# Patient Record
Sex: FEMALE | Race: BLACK OR AFRICAN AMERICAN | NOT HISPANIC OR LATINO | URBAN - METROPOLITAN AREA
[De-identification: names, ages, dates, MRNs, and addresses within clinical notes are randomized per-mention and may not be internally consistent; named-entity substitution may affect disease eponyms.]

---

## 2017-06-14 ENCOUNTER — DOCUMENTATION ONLY (OUTPATIENT)
Dept: TRANSPLANT | Facility: CLINIC | Age: 27
End: 2017-06-14

## 2017-06-14 NOTE — NURSING
Arturo spoke with CHARISSA Ramirez, [facility] at patient's dialysis unit and informed that the referral was received. We are not able to process the referral due to lack of nephrologist notes. Bebe verbalized understanding of the above information and states she will fax over the requested information.

## 2017-06-15 ENCOUNTER — TELEPHONE (OUTPATIENT)
Dept: TRANSPLANT | Facility: CLINIC | Age: 27
End: 2017-06-15

## 2019-12-12 DIAGNOSIS — T85.611A PD CATHETER DYSFUNCTION: Primary | ICD-10-CM

## 2020-03-04 DIAGNOSIS — Z49.01 ENCOUNTER FOR FITTING AND ADJUSTMENT OF DIALYSIS CATHETER: Primary | ICD-10-CM

## 2020-06-24 DIAGNOSIS — N18.6 END STAGE RENAL DISEASE: Primary | ICD-10-CM

## 2020-07-01 ENCOUNTER — OFFICE VISIT (OUTPATIENT)
Dept: SURGERY | Facility: CLINIC | Age: 30
End: 2020-07-01
Payer: MEDICARE

## 2020-07-01 DIAGNOSIS — N18.6 END STAGE RENAL DISEASE: Primary | ICD-10-CM

## 2020-07-01 PROCEDURE — 99202 PR OFFICE/OUTPT VISIT, NEW, LEVL II, 15-29 MIN: ICD-10-PCS | Mod: S$GLB,,, | Performed by: SURGERY

## 2020-07-01 PROCEDURE — 99202 OFFICE O/P NEW SF 15 MIN: CPT | Mod: S$GLB,,, | Performed by: SURGERY

## 2020-07-01 RX ORDER — MINOXIDIL 2.5 MG/1
2.5 TABLET ORAL DAILY
COMMUNITY

## 2020-07-01 RX ORDER — PROMETHAZINE HYDROCHLORIDE 25 MG/1
TABLET ORAL
COMMUNITY

## 2020-07-01 RX ORDER — CLONIDINE 0.3 MG/24H
PATCH, EXTENDED RELEASE TRANSDERMAL
COMMUNITY
Start: 2020-06-27

## 2020-07-01 RX ORDER — LABETALOL 100 MG/1
TABLET, FILM COATED ORAL
COMMUNITY

## 2020-07-01 RX ORDER — TRAZODONE HYDROCHLORIDE 50 MG/1
TABLET ORAL
COMMUNITY
Start: 2020-06-16

## 2020-07-01 RX ORDER — CINACALCET 30 MG/1
TABLET, FILM COATED ORAL
COMMUNITY
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
## 2020-07-01 NOTE — LETTER
July 1, 2020      Brenton Dumont MD  [facility] [address]  [place] [address]           [facility] General [facility]  [address]  [place] [address]  Phone: [phone]  Fax: [phone]          Patient: Lizett Luque   MR Number: [id number]   YOB: 1990   Date of Visit: 7/1/2020       Dear Dr. Brenton Dumont:    Thank you for referring Lizett Luque to me for evaluation. Attached you will find relevant portions of my assessment and plan of care.    If you have questions, please do not hesitate to call me. I look forward to following Lizett Luque along with you.    Sincerely,    Hernandez Eldridge MD    Enclosure  CC:  No Recipients    If you would like to receive this communication electronically, please contact [email or web address] or [phone] to request more information on [employer] access.    For providers and/or their staff who would like to refer a patient to [facility], please contact us through our one-stop-shop provider referral line, [facility] , at [phone].    If you feel you have received this communication in error or would no longer like to receive these types of communications, please e-mail [email or web address]

## 2020-07-07 LAB
ANION GAP SERPL CALC-SCNC: 10 MMOL/L (ref 3–11)
BASOPHILS NFR SNV MANUAL: 0.4 % (ref 0–3)
BUN SERPL-MCNC: 19 MG/DL (ref 7–18)
BUN/CREAT SERPL: 2.15 RATIO (ref 7–18)
CALCIUM BLD-MCNC: NEGATIVE MG/DL
CALCIUM SERPL-MCNC: 9.6 MG/DL (ref 8.8–10.5)
CHLORIDE SERPL-SCNC: 101 MMOL/L (ref 100–108)
CO2 SERPL-SCNC: 31 MMOL/L (ref 21–32)
COVID-19 AB, IGM: NEGATIVE
CREAT SERPL-MCNC: 8.82 MG/DL (ref 0.55–1.02)
EOSINOPHIL NFR SNV MANUAL: 3.1 % (ref 1–3)
ERYTHROCYTE [DISTWIDTH] IN BLOOD BY AUTOMATED COUNT: 15.9 % (ref 12.5–18)
GFR ESTIMATION: 6
GLUCOSE SERPL-MCNC: 91 MG/DL (ref 70–110)
HCG QUALITATIVE: NEGATIVE
HCT VFR BLD AUTO: 37.8 % (ref 37–47)
HGB BLD-MCNC: 12 G/DL (ref 12–16)
LYMPHOCYTES NFR SNV MANUAL: 11.3 % (ref 25–40)
MANUAL NRBC PER 100 CELLS: 0 %
MCH RBC QN AUTO: 31.4 PG (ref 27–31.2)
MCHC RBC AUTO-ENTMCNC: 31.7 G/DL (ref 31.8–35.4)
MCV RBC AUTO: 99 FL (ref 80–97)
MONOCYTES/100 LEUKOCYTES: 9.8 % (ref 1–15)
NEUTROPHILS NFR BLD: 3.38 10*3/UL (ref 1.8–7.7)
NEUTROPHILS NFR SNV MANUAL: 75.2 % (ref 37–80)
PLATELETS: 193 10*3/UL (ref 142–424)
POTASSIUM SERPL-SCNC: 4.1 MMOL/L (ref 3.6–5.2)
RBC # BLD AUTO: 3.82 10*6/UL (ref 4.2–5.4)
SODIUM BLD-SCNC: 142 MMOL/L (ref 135–145)
WBC # BLD: 4.5 10*3/UL (ref 4.6–10.2)

## 2020-07-14 ENCOUNTER — OUTSIDE PLACE OF SERVICE (OUTPATIENT)
Dept: ADMINISTRATIVE | Facility: OTHER | Age: 30
End: 2020-07-14
Payer: MEDICARE

## 2020-07-14 LAB — POTASSIUM SERPL-SCNC: 4.6 MMOL/L (ref 3.6–5.2)

## 2020-07-14 PROCEDURE — 49422 PR REMOVAL TUNNELED INTRAPERITONEAL CATHETER: ICD-10-PCS | Mod: ,,, | Performed by: SURGERY

## 2020-07-14 PROCEDURE — 49422 REMOVE TUNNELED IP CATH: CPT | Mod: ,,, | Performed by: SURGERY

## 2021-03-18 ENCOUNTER — OFFICE VISIT (OUTPATIENT)
Dept: OBSTETRICS AND GYNECOLOGY | Facility: CLINIC | Age: 31
End: 2021-03-18
Payer: MEDICARE

## 2021-03-18 VITALS
WEIGHT: 151 LBS | SYSTOLIC BLOOD PRESSURE: 130 MMHG | HEART RATE: 86 BPM | DIASTOLIC BLOOD PRESSURE: 84 MMHG | BODY MASS INDEX: 22.36 KG/M2 | HEIGHT: 69 IN

## 2021-03-18 DIAGNOSIS — Z01.419 WOMEN'S ANNUAL ROUTINE GYNECOLOGICAL EXAMINATION: ICD-10-CM

## 2021-03-18 DIAGNOSIS — N93.8 DUB (DYSFUNCTIONAL UTERINE BLEEDING): Primary | ICD-10-CM

## 2021-03-18 PROCEDURE — 99213 PR OFFICE/OUTPT VISIT, EST, LEVL III, 20-29 MIN: ICD-10-PCS | Mod: S$GLB,,, | Performed by: OBSTETRICS & GYNECOLOGY

## 2021-03-18 PROCEDURE — 99213 OFFICE O/P EST LOW 20 MIN: CPT | Mod: S$GLB,,, | Performed by: OBSTETRICS & GYNECOLOGY

## 2021-03-18 RX ORDER — ERGOCALCIFEROL (VITAMIN D2) 10 MCG
2.75 TABLET ORAL
COMMUNITY
Start: 2021-01-08 | End: 2022-01-07

## 2021-03-18 RX ORDER — PROMETHAZINE HYDROCHLORIDE 12.5 MG/1
12.5 TABLET ORAL DAILY PRN
COMMUNITY

## 2021-04-15 ENCOUNTER — TELEPHONE (OUTPATIENT)
Dept: OBSTETRICS AND GYNECOLOGY | Facility: CLINIC | Age: 31
End: 2021-04-15

## 2025-01-13 ENCOUNTER — OFFICE VISIT (OUTPATIENT)
Dept: PRIMARY CARE CLINIC | Facility: CLINIC | Age: 35
End: 2025-01-13
Payer: COMMERCIAL

## 2025-01-13 VITALS
DIASTOLIC BLOOD PRESSURE: 80 MMHG | RESPIRATION RATE: 16 BRPM | WEIGHT: 188 LBS | HEART RATE: 79 BPM | OXYGEN SATURATION: 98 % | SYSTOLIC BLOOD PRESSURE: 120 MMHG | HEIGHT: 69 IN | BODY MASS INDEX: 27.85 KG/M2

## 2025-01-13 DIAGNOSIS — Z00.00 PREVENTATIVE HEALTH CARE: Primary | ICD-10-CM

## 2025-01-13 DIAGNOSIS — F41.1 GENERALIZED ANXIETY DISORDER: ICD-10-CM

## 2025-01-13 DIAGNOSIS — Z94.0 RENAL TRANSPLANT RECIPIENT: ICD-10-CM

## 2025-01-13 DIAGNOSIS — I10 HYPERTENSION, UNSPECIFIED TYPE: ICD-10-CM

## 2025-01-13 PROCEDURE — 3079F DIAST BP 80-89 MM HG: CPT | Mod: CPTII,,, | Performed by: FAMILY MEDICINE

## 2025-01-13 PROCEDURE — 99385 PREV VISIT NEW AGE 18-39: CPT | Mod: S$PBB,,, | Performed by: FAMILY MEDICINE

## 2025-01-13 PROCEDURE — 1159F MED LIST DOCD IN RCRD: CPT | Mod: CPTII,,, | Performed by: FAMILY MEDICINE

## 2025-01-13 PROCEDURE — 3074F SYST BP LT 130 MM HG: CPT | Mod: CPTII,,, | Performed by: FAMILY MEDICINE

## 2025-01-13 PROCEDURE — 1160F RVW MEDS BY RX/DR IN RCRD: CPT | Mod: CPTII,,, | Performed by: FAMILY MEDICINE

## 2025-01-13 PROCEDURE — 3008F BODY MASS INDEX DOCD: CPT | Mod: CPTII,,, | Performed by: FAMILY MEDICINE

## 2025-01-13 RX ORDER — SODIUM BICARBONATE 650 MG/1
1 TABLET ORAL DAILY
COMMUNITY
Start: 2024-07-27 | End: 2025-01-13 | Stop reason: SDUPTHER

## 2025-01-13 RX ORDER — CLONIDINE 0.3 MG/24H
1 PATCH, EXTENDED RELEASE TRANSDERMAL
Qty: 12 PATCH | Refills: 3 | Status: SHIPPED | OUTPATIENT
Start: 2025-01-13 | End: 2025-01-13 | Stop reason: SDUPTHER

## 2025-01-13 RX ORDER — SODIUM BICARBONATE 650 MG/1
650 TABLET ORAL DAILY
Qty: 90 TABLET | Refills: 3 | Status: SHIPPED | OUTPATIENT
Start: 2025-01-13 | End: 2026-01-13

## 2025-01-13 RX ORDER — BUSPIRONE HYDROCHLORIDE 10 MG/1
10 TABLET ORAL 3 TIMES DAILY
Qty: 270 TABLET | Refills: 3 | Status: SHIPPED | OUTPATIENT
Start: 2025-01-13

## 2025-01-13 RX ORDER — TACROLIMUS 4 MG/1
8 TABLET, EXTENDED RELEASE ORAL
COMMUNITY
Start: 2024-11-15

## 2025-01-13 RX ORDER — CLONIDINE 0.3 MG/24H
1 PATCH, EXTENDED RELEASE TRANSDERMAL
Qty: 12 PATCH | Refills: 3 | Status: SHIPPED | OUTPATIENT
Start: 2025-01-13 | End: 2025-01-17 | Stop reason: SDUPTHER

## 2025-01-13 RX ORDER — TACROLIMUS 1 MG/1
TABLET, EXTENDED RELEASE ORAL
COMMUNITY

## 2025-01-13 RX ORDER — LABETALOL 100 MG/1
50 TABLET, FILM COATED ORAL 2 TIMES DAILY
Qty: 180 TABLET | Refills: 3 | Status: SHIPPED | OUTPATIENT
Start: 2025-01-13

## 2025-01-13 RX ORDER — PREDNISONE 5 MG/1
5 TABLET ORAL
COMMUNITY

## 2025-01-13 RX ORDER — BUSPIRONE HYDROCHLORIDE 10 MG/1
10 TABLET ORAL 3 TIMES DAILY
COMMUNITY
End: 2025-01-13 | Stop reason: SDUPTHER

## 2025-01-13 NOTE — PROGRESS NOTES
Subjective     Patient ID: Lizett Luque is a 34 y.o. female.    Chief Complaint: Establish Care    HPI    Here to est care  Due for annual labs and gyn   Reviewed hx - htn and esrd, s/p renal transplant  Bp is well controlled on current  On steroids and tacrolimus     Review of Systems   Constitutional:  Negative for chills, diaphoresis, fatigue, fever and unexpected weight change.   HENT:  Negative for nasal congestion, hearing loss, rhinorrhea, sinus pressure/congestion, sore throat, trouble swallowing and voice change.    Eyes:  Negative for pain and visual disturbance.   Respiratory:  Negative for cough, chest tightness, shortness of breath and wheezing.    Cardiovascular:  Negative for chest pain, palpitations and leg swelling.   Gastrointestinal:  Negative for abdominal pain, constipation, diarrhea, nausea and vomiting.   Genitourinary:  Negative for decreased urine volume, dysuria, flank pain, frequency, hematuria, pelvic pain, vaginal bleeding and vaginal discharge.   Musculoskeletal:  Negative for arthralgias, back pain, myalgias and neck pain.   Integumentary:  Negative for color change, pallor and rash.   Neurological:  Negative for dizziness, syncope, weakness, light-headedness and headaches.   Hematological:  Negative for adenopathy.   Psychiatric/Behavioral:  Negative for decreased concentration, dysphoric mood and sleep disturbance. The patient is not nervous/anxious.           Objective     Physical Exam  Vitals reviewed.   Constitutional:       General: She is not in acute distress.     Appearance: She is well-developed. She is not diaphoretic.   HENT:      Head: Normocephalic and atraumatic.      Nose: Nose normal.      Mouth/Throat:      Mouth: Mucous membranes are moist.      Pharynx: Oropharynx is clear.   Eyes:      Conjunctiva/sclera: Conjunctivae normal.      Pupils: Pupils are equal, round, and reactive to light.   Neck:      Thyroid: No thyromegaly.      Vascular: No JVD.    Cardiovascular:      Rate and Rhythm: Normal rate and regular rhythm.      Pulses: Normal pulses.      Heart sounds: Normal heart sounds. No murmur heard.     No friction rub. No gallop.   Pulmonary:      Effort: Pulmonary effort is normal. No respiratory distress.      Breath sounds: Normal breath sounds. No stridor. No wheezing or rales.   Abdominal:      General: Bowel sounds are normal. There is no distension.      Palpations: Abdomen is soft.      Tenderness: There is no abdominal tenderness.   Musculoskeletal:         General: No swelling or tenderness.      Cervical back: Normal range of motion and neck supple.      Right lower leg: No edema.      Left lower leg: No edema.   Lymphadenopathy:      Cervical: No cervical adenopathy.   Skin:     General: Skin is warm and dry.      Coloration: Skin is not pale.      Findings: No erythema or rash.   Neurological:      Mental Status: She is alert and oriented to person, place, and time.   Psychiatric:         Mood and Affect: Mood normal.         Behavior: Behavior normal.            Assessment and Plan     1. Preventative health care  -     Ambulatory referral/consult to Obstetrics / Gynecology; Future; Expected date: 01/20/2025  -     Lipid Panel; Future; Expected date: 01/13/2025  -     Hemoglobin A1C; Future; Expected date: 01/13/2025  -     TSH; Future; Expected date: 01/13/2025  -     T4, Free; Future; Expected date: 01/13/2025    2. Renal transplant recipient    3. Hypertension, unspecified type  -     labetaloL (NORMODYNE) 100 MG tablet; Take 0.5 tablets (50 mg total) by mouth 2 (two) times a day.  Dispense: 180 tablet; Refill: 3  -     cloNIDine 0.3 mg/24 hr td ptwk (CATAPRES) 0.3 mg/24 hr; Place 1 patch onto the skin every 7 days.  Dispense: 12 patch; Refill: 3    4. Generalized anxiety disorder  -     busPIRone (BUSPAR) 10 MG tablet; Take 1 tablet (10 mg total) by mouth 3 (three) times daily.  Dispense: 270 tablet; Refill: 3    Other orders  -      Discontinue: cloNIDine 0.3 mg/24 hr td ptwk (CATAPRES) 0.3 mg/24 hr; Place 1 patch onto the skin every 7 days.  Dispense: 12 patch; Refill: 3  -     sodium bicarbonate 650 MG tablet; Take 1 tablet (650 mg total) by mouth once daily.  Dispense: 90 tablet; Refill: 3  -     Discontinue: cloNIDine 0.3 mg/24 hr td ptwk (CATAPRES) 0.3 mg/24 hr; Place 1 patch onto the skin every 7 days.  Dispense: 12 patch; Refill: 3                 No follow-ups on file.

## 2025-01-17 DIAGNOSIS — I10 HYPERTENSION, UNSPECIFIED TYPE: ICD-10-CM

## 2025-01-18 RX ORDER — CLONIDINE 0.3 MG/24H
1 PATCH, EXTENDED RELEASE TRANSDERMAL
Qty: 12 PATCH | Refills: 3 | Status: SHIPPED | OUTPATIENT
Start: 2025-01-18